# Patient Record
Sex: MALE | Race: WHITE | NOT HISPANIC OR LATINO | URBAN - METROPOLITAN AREA
[De-identification: names, ages, dates, MRNs, and addresses within clinical notes are randomized per-mention and may not be internally consistent; named-entity substitution may affect disease eponyms.]

---

## 2019-06-27 ENCOUNTER — INPATIENT (INPATIENT)
Facility: HOSPITAL | Age: 64
LOS: 2 days | Discharge: HOME | DRG: 493 | End: 2019-06-30
Attending: ORTHOPAEDIC SURGERY | Admitting: ORTHOPAEDIC SURGERY
Payer: MEDICARE

## 2019-06-27 VITALS
DIASTOLIC BLOOD PRESSURE: 79 MMHG | TEMPERATURE: 99 F | SYSTOLIC BLOOD PRESSURE: 133 MMHG | RESPIRATION RATE: 18 BRPM | OXYGEN SATURATION: 95 % | HEART RATE: 52 BPM

## 2019-06-27 LAB
ALBUMIN SERPL ELPH-MCNC: 3.9 G/DL — SIGNIFICANT CHANGE UP (ref 3.4–5)
ALP SERPL-CCNC: 82 U/L — SIGNIFICANT CHANGE UP (ref 40–120)
ALT FLD-CCNC: 23 U/L — SIGNIFICANT CHANGE UP (ref 12–42)
ANION GAP SERPL CALC-SCNC: 9 MMOL/L — SIGNIFICANT CHANGE UP (ref 9–16)
APTT BLD: 26.6 SEC — LOW (ref 27.5–36.3)
AST SERPL-CCNC: 23 U/L — SIGNIFICANT CHANGE UP (ref 15–37)
BASOPHILS NFR BLD AUTO: 0.5 % — SIGNIFICANT CHANGE UP (ref 0–2)
BILIRUB SERPL-MCNC: 0.3 MG/DL — SIGNIFICANT CHANGE UP (ref 0.2–1.2)
BUN SERPL-MCNC: 20 MG/DL — SIGNIFICANT CHANGE UP (ref 7–23)
CALCIUM SERPL-MCNC: 9.6 MG/DL — SIGNIFICANT CHANGE UP (ref 8.5–10.5)
CHLORIDE SERPL-SCNC: 103 MMOL/L — SIGNIFICANT CHANGE UP (ref 96–108)
CO2 SERPL-SCNC: 30 MMOL/L — SIGNIFICANT CHANGE UP (ref 22–31)
CREAT SERPL-MCNC: 1.36 MG/DL — HIGH (ref 0.5–1.3)
EOSINOPHIL NFR BLD AUTO: 2.4 % — SIGNIFICANT CHANGE UP (ref 0–6)
GLUCOSE SERPL-MCNC: 125 MG/DL — HIGH (ref 70–99)
HCT VFR BLD CALC: 45.5 % — SIGNIFICANT CHANGE UP (ref 39–50)
HGB BLD-MCNC: 15.7 G/DL — SIGNIFICANT CHANGE UP (ref 13–17)
IMM GRANULOCYTES NFR BLD AUTO: 0.7 % — SIGNIFICANT CHANGE UP (ref 0–1.5)
INR BLD: 1.1 — SIGNIFICANT CHANGE UP (ref 0.88–1.16)
LYMPHOCYTES # BLD AUTO: 25.2 % — SIGNIFICANT CHANGE UP (ref 13–44)
MAGNESIUM SERPL-MCNC: 2 MG/DL — SIGNIFICANT CHANGE UP (ref 1.6–2.6)
MCHC RBC-ENTMCNC: 32.5 PG — SIGNIFICANT CHANGE UP (ref 27–34)
MCHC RBC-ENTMCNC: 34.5 G/DL — SIGNIFICANT CHANGE UP (ref 32–36)
MCV RBC AUTO: 94.2 FL — SIGNIFICANT CHANGE UP (ref 80–100)
MONOCYTES NFR BLD AUTO: 5.8 % — SIGNIFICANT CHANGE UP (ref 2–14)
NEUTROPHILS NFR BLD AUTO: 65.4 % — SIGNIFICANT CHANGE UP (ref 43–77)
PLATELET # BLD AUTO: 294 K/UL — SIGNIFICANT CHANGE UP (ref 150–400)
POTASSIUM SERPL-MCNC: 4.1 MMOL/L — SIGNIFICANT CHANGE UP (ref 3.5–5.3)
POTASSIUM SERPL-SCNC: 4.1 MMOL/L — SIGNIFICANT CHANGE UP (ref 3.5–5.3)
PROT SERPL-MCNC: 7.8 G/DL — SIGNIFICANT CHANGE UP (ref 6.4–8.2)
PROTHROM AB SERPL-ACNC: 12.1 SEC — SIGNIFICANT CHANGE UP (ref 10–12.9)
RBC # BLD: 4.83 M/UL — SIGNIFICANT CHANGE UP (ref 4.2–5.8)
RBC # FLD: 12.2 % — SIGNIFICANT CHANGE UP (ref 10.3–14.5)
SODIUM SERPL-SCNC: 142 MMOL/L — SIGNIFICANT CHANGE UP (ref 132–145)
WBC # BLD: 13.5 K/UL — HIGH (ref 3.8–10.5)
WBC # FLD AUTO: 13.5 K/UL — HIGH (ref 3.8–10.5)

## 2019-06-27 PROCEDURE — 73610 X-RAY EXAM OF ANKLE: CPT | Mod: 26,RT

## 2019-06-27 PROCEDURE — 73700 CT LOWER EXTREMITY W/O DYE: CPT | Mod: 26,RT

## 2019-06-27 PROCEDURE — 99284 EMERGENCY DEPT VISIT MOD MDM: CPT | Mod: 25

## 2019-06-27 PROCEDURE — 73610 X-RAY EXAM OF ANKLE: CPT | Mod: 26,RT,76

## 2019-06-27 RX ORDER — HYDROMORPHONE HYDROCHLORIDE 2 MG/ML
0.5 INJECTION INTRAMUSCULAR; INTRAVENOUS; SUBCUTANEOUS ONCE
Refills: 0 | Status: DISCONTINUED | OUTPATIENT
Start: 2019-06-27 | End: 2019-06-27

## 2019-06-27 RX ORDER — ONDANSETRON 8 MG/1
4 TABLET, FILM COATED ORAL ONCE
Refills: 0 | Status: COMPLETED | OUTPATIENT
Start: 2019-06-27 | End: 2019-06-27

## 2019-06-27 RX ORDER — IBUPROFEN 200 MG
600 TABLET ORAL ONCE
Refills: 0 | Status: COMPLETED | OUTPATIENT
Start: 2019-06-27 | End: 2019-06-27

## 2019-06-27 RX ORDER — HYDROMORPHONE HYDROCHLORIDE 2 MG/ML
2 INJECTION INTRAMUSCULAR; INTRAVENOUS; SUBCUTANEOUS ONCE
Refills: 0 | Status: DISCONTINUED | OUTPATIENT
Start: 2019-06-27 | End: 2019-06-27

## 2019-06-27 RX ORDER — MORPHINE SULFATE 50 MG/1
4 CAPSULE, EXTENDED RELEASE ORAL ONCE
Refills: 0 | Status: DISCONTINUED | OUTPATIENT
Start: 2019-06-27 | End: 2019-06-27

## 2019-06-27 RX ADMIN — ONDANSETRON 4 MILLIGRAM(S): 8 TABLET, FILM COATED ORAL at 21:11

## 2019-06-27 RX ADMIN — HYDROMORPHONE HYDROCHLORIDE 0.5 MILLIGRAM(S): 2 INJECTION INTRAMUSCULAR; INTRAVENOUS; SUBCUTANEOUS at 21:11

## 2019-06-27 RX ADMIN — Medication 600 MILLIGRAM(S): at 20:51

## 2019-06-27 RX ADMIN — HYDROMORPHONE HYDROCHLORIDE 2 MILLIGRAM(S): 2 INJECTION INTRAMUSCULAR; INTRAVENOUS; SUBCUTANEOUS at 22:29

## 2019-06-27 NOTE — ED PROVIDER NOTE - DIAGNOSTIC INTERPRETATION
Interpreted by: JOSE Hutson  Right foot x-ray, 3 views Interpreted by ED physician  ankle x-ray, 3 views  +distal fibular fx, +dislocation, ? posterior malleolar fx; no Foreign Body noted, soft tissue swelling    Interpreted by ED physician  ankle x-ray, 3 views  joint reduced, no Foreign Body noted, soft tissue normal

## 2019-06-27 NOTE — ED ADULT NURSE NOTE - NSIMPLEMENTINTERV_GEN_ALL_ED
Implemented All Universal Safety Interventions:  [place] to call system. Call bell, personal items and telephone within reach. Instruct patient to call for assistance. Room bathroom lighting operational. Non-slip footwear when patient is off stretcher. Physically safe environment: no spills, clutter or unnecessary equipment. Stretcher in lowest position, wheels locked, appropriate side rails in place.

## 2019-06-27 NOTE — ED PROVIDER NOTE - CLINICAL SUMMARY MEDICAL DECISION MAKING FREE TEXT BOX
R ankle fracture dislocation.  NVID.  Ortho consulted.  IV placed and pain control given.  Labs sent.      Ortho came and reduced ankle.  Would like to admit for ORIF due to unstable nature of injury.

## 2019-06-27 NOTE — ED PROVIDER NOTE - MUSCULOSKELETAL, MLM
+Visible closed right ankle deformity, bone grazing the skin, +swelling. +Visible closed right ankle deformity, +swelling.

## 2019-06-27 NOTE — ED ADULT TRIAGE NOTE - CHIEF COMPLAINT QUOTE
Patient c/o right ankle pain after he slid down carpeted steps, pt states he lost his balance. Denies hitting his head.

## 2019-06-27 NOTE — ED PROVIDER NOTE - CONSTITUTIONAL, MLM
normal... Well nourished, awake, alert, oriented to person, place, time/situation and in acute distress.

## 2019-06-27 NOTE — ED PROVIDER NOTE - CARDIAC, MLM
Normal rate, regular rhythm.  No murmurs, rubs or gallops. Normal rate, regular rhythm.  No murmurs, rubs or gallops. 2+ pedal pulses

## 2019-06-27 NOTE — ED PROVIDER NOTE - OBJECTIVE STATEMENT
64 y o male with no significant hx was BIBA for right ankle burning pain s/p injury. Pt slipped down a few steps and landed on his buttock at around 7pm. Notes pain and swelling to site w/ associated tingling. No other injuries. 64 y o male with no significant hx was BIBA for right ankle burning pain s/p injury. Pt slipped down a few steps and landed on his buttock at around 7pm. Notes pain and swelling to site w/ associated tingling. No other injuries.  Denies hitting his head.  Pt denies any blood thinners.

## 2019-06-27 NOTE — ED PROVIDER NOTE - ATTENDING CONTRIBUTION TO CARE
Pt 63yo M with no PMH who was BIB EMS for ankle injury s/p mechanical trip and fall on stairs.  Severe pain and deformity to R ankle.  No breaks in skin.  No head injury.   PE - agree with PA exam as above.  NVID.   A/P - Ankle fracture dislocations.   Ortho consulted and will admit.  Reduction and splint performed here by ortho attending, Dr. Cristobal.

## 2019-06-28 LAB
ANION GAP SERPL CALC-SCNC: 11 MMOL/L — SIGNIFICANT CHANGE UP (ref 5–17)
APPEARANCE UR: CLEAR — SIGNIFICANT CHANGE UP
APTT BLD: 28 SEC — SIGNIFICANT CHANGE UP (ref 27.5–36.3)
BASOPHILS # BLD AUTO: 0.03 K/UL — SIGNIFICANT CHANGE UP (ref 0–0.2)
BASOPHILS NFR BLD AUTO: 0.3 % — SIGNIFICANT CHANGE UP (ref 0–2)
BILIRUB UR-MCNC: NEGATIVE — SIGNIFICANT CHANGE UP
BUN SERPL-MCNC: 18 MG/DL — SIGNIFICANT CHANGE UP (ref 7–23)
CALCIUM SERPL-MCNC: 8.8 MG/DL — SIGNIFICANT CHANGE UP (ref 8.4–10.5)
CHLORIDE SERPL-SCNC: 103 MMOL/L — SIGNIFICANT CHANGE UP (ref 96–108)
CO2 SERPL-SCNC: 27 MMOL/L — SIGNIFICANT CHANGE UP (ref 22–31)
COLOR SPEC: YELLOW — SIGNIFICANT CHANGE UP
CREAT SERPL-MCNC: 0.98 MG/DL — SIGNIFICANT CHANGE UP (ref 0.5–1.3)
DIFF PNL FLD: NEGATIVE — SIGNIFICANT CHANGE UP
EOSINOPHIL # BLD AUTO: 0.02 K/UL — SIGNIFICANT CHANGE UP (ref 0–0.5)
EOSINOPHIL NFR BLD AUTO: 0.2 % — SIGNIFICANT CHANGE UP (ref 0–6)
GLUCOSE BLDC GLUCOMTR-MCNC: 78 MG/DL — SIGNIFICANT CHANGE UP (ref 70–99)
GLUCOSE SERPL-MCNC: 117 MG/DL — HIGH (ref 70–99)
GLUCOSE UR QL: NEGATIVE — SIGNIFICANT CHANGE UP
HCT VFR BLD CALC: 43 % — SIGNIFICANT CHANGE UP (ref 39–50)
HGB BLD-MCNC: 14 G/DL — SIGNIFICANT CHANGE UP (ref 13–17)
IMM GRANULOCYTES NFR BLD AUTO: 0.5 % — SIGNIFICANT CHANGE UP (ref 0–1.5)
INR BLD: 1.13 — SIGNIFICANT CHANGE UP (ref 0.88–1.16)
KETONES UR-MCNC: NEGATIVE — SIGNIFICANT CHANGE UP
LEUKOCYTE ESTERASE UR-ACNC: NEGATIVE — SIGNIFICANT CHANGE UP
LYMPHOCYTES # BLD AUTO: 1.46 K/UL — SIGNIFICANT CHANGE UP (ref 1–3.3)
LYMPHOCYTES # BLD AUTO: 15.2 % — SIGNIFICANT CHANGE UP (ref 13–44)
MCHC RBC-ENTMCNC: 32.3 PG — SIGNIFICANT CHANGE UP (ref 27–34)
MCHC RBC-ENTMCNC: 32.6 GM/DL — SIGNIFICANT CHANGE UP (ref 32–36)
MCV RBC AUTO: 99.1 FL — SIGNIFICANT CHANGE UP (ref 80–100)
MONOCYTES # BLD AUTO: 0.66 K/UL — SIGNIFICANT CHANGE UP (ref 0–0.9)
MONOCYTES NFR BLD AUTO: 6.9 % — SIGNIFICANT CHANGE UP (ref 2–14)
NEUTROPHILS # BLD AUTO: 7.39 K/UL — SIGNIFICANT CHANGE UP (ref 1.8–7.4)
NEUTROPHILS NFR BLD AUTO: 76.9 % — SIGNIFICANT CHANGE UP (ref 43–77)
NITRITE UR-MCNC: NEGATIVE — SIGNIFICANT CHANGE UP
NRBC # BLD: 0 /100 WBCS — SIGNIFICANT CHANGE UP (ref 0–0)
PH UR: 6 — SIGNIFICANT CHANGE UP (ref 5–8)
PLATELET # BLD AUTO: 229 K/UL — SIGNIFICANT CHANGE UP (ref 150–400)
POTASSIUM SERPL-MCNC: 4.4 MMOL/L — SIGNIFICANT CHANGE UP (ref 3.5–5.3)
POTASSIUM SERPL-SCNC: 4.4 MMOL/L — SIGNIFICANT CHANGE UP (ref 3.5–5.3)
PROT UR-MCNC: NEGATIVE MG/DL — SIGNIFICANT CHANGE UP
PROTHROM AB SERPL-ACNC: 12.8 SEC — SIGNIFICANT CHANGE UP (ref 10–12.9)
RBC # BLD: 4.34 M/UL — SIGNIFICANT CHANGE UP (ref 4.2–5.8)
RBC # FLD: 12.3 % — SIGNIFICANT CHANGE UP (ref 10.3–14.5)
SODIUM SERPL-SCNC: 141 MMOL/L — SIGNIFICANT CHANGE UP (ref 135–145)
SP GR SPEC: 1.02 — SIGNIFICANT CHANGE UP (ref 1–1.03)
UROBILINOGEN FLD QL: 0.2 E.U./DL — SIGNIFICANT CHANGE UP
WBC # BLD: 9.61 K/UL — SIGNIFICANT CHANGE UP (ref 3.8–10.5)
WBC # FLD AUTO: 9.61 K/UL — SIGNIFICANT CHANGE UP (ref 3.8–10.5)

## 2019-06-28 PROCEDURE — 73610 X-RAY EXAM OF ANKLE: CPT | Mod: 26,RT

## 2019-06-28 PROCEDURE — 99223 1ST HOSP IP/OBS HIGH 75: CPT | Mod: GC

## 2019-06-28 PROCEDURE — 93010 ELECTROCARDIOGRAM REPORT: CPT

## 2019-06-28 RX ORDER — SODIUM CHLORIDE 9 MG/ML
1000 INJECTION, SOLUTION INTRAVENOUS
Refills: 0 | Status: DISCONTINUED | OUTPATIENT
Start: 2019-06-28 | End: 2019-06-28

## 2019-06-28 RX ORDER — ATORVASTATIN CALCIUM 80 MG/1
20 TABLET, FILM COATED ORAL AT BEDTIME
Refills: 0 | Status: DISCONTINUED | OUTPATIENT
Start: 2019-06-28 | End: 2019-06-30

## 2019-06-28 RX ORDER — METOPROLOL TARTRATE 50 MG
25 TABLET ORAL DAILY
Refills: 0 | Status: DISCONTINUED | OUTPATIENT
Start: 2019-06-28 | End: 2019-06-30

## 2019-06-28 RX ORDER — MORPHINE SULFATE 50 MG/1
2 CAPSULE, EXTENDED RELEASE ORAL
Refills: 0 | Status: DISCONTINUED | OUTPATIENT
Start: 2019-06-28 | End: 2019-06-29

## 2019-06-28 RX ORDER — PANTOPRAZOLE SODIUM 20 MG/1
40 TABLET, DELAYED RELEASE ORAL
Refills: 0 | Status: DISCONTINUED | OUTPATIENT
Start: 2019-06-28 | End: 2019-06-30

## 2019-06-28 RX ORDER — METOCLOPRAMIDE HCL 10 MG
10 TABLET ORAL EVERY 6 HOURS
Refills: 0 | Status: DISCONTINUED | OUTPATIENT
Start: 2019-06-28 | End: 2019-06-30

## 2019-06-28 RX ORDER — ACETAMINOPHEN 500 MG
650 TABLET ORAL EVERY 6 HOURS
Refills: 0 | Status: DISCONTINUED | OUTPATIENT
Start: 2019-06-28 | End: 2019-06-30

## 2019-06-28 RX ORDER — MORPHINE SULFATE 50 MG/1
4 CAPSULE, EXTENDED RELEASE ORAL EVERY 4 HOURS
Refills: 0 | Status: DISCONTINUED | OUTPATIENT
Start: 2019-06-28 | End: 2019-06-29

## 2019-06-28 RX ORDER — OXYCODONE AND ACETAMINOPHEN 5; 325 MG/1; MG/1
1 TABLET ORAL EVERY 6 HOURS
Refills: 0 | Status: DISCONTINUED | OUTPATIENT
Start: 2019-06-28 | End: 2019-06-29

## 2019-06-28 RX ORDER — METOPROLOL TARTRATE 50 MG
1 TABLET ORAL
Qty: 0 | Refills: 0 | DISCHARGE

## 2019-06-28 RX ORDER — CEFAZOLIN SODIUM 1 G
2000 VIAL (EA) INJECTION EVERY 8 HOURS
Refills: 0 | Status: COMPLETED | OUTPATIENT
Start: 2019-06-29 | End: 2019-06-29

## 2019-06-28 RX ORDER — ASPIRIN/CALCIUM CARB/MAGNESIUM 324 MG
81 TABLET ORAL
Refills: 0 | Status: DISCONTINUED | OUTPATIENT
Start: 2019-06-28 | End: 2019-06-30

## 2019-06-28 RX ORDER — ONDANSETRON 8 MG/1
4 TABLET, FILM COATED ORAL EVERY 6 HOURS
Refills: 0 | Status: DISCONTINUED | OUTPATIENT
Start: 2019-06-28 | End: 2019-06-30

## 2019-06-28 RX ORDER — ATORVASTATIN CALCIUM 80 MG/1
1 TABLET, FILM COATED ORAL
Qty: 0 | Refills: 0 | DISCHARGE

## 2019-06-28 RX ORDER — SODIUM CHLORIDE 9 MG/ML
1000 INJECTION, SOLUTION INTRAVENOUS
Refills: 0 | Status: DISCONTINUED | OUTPATIENT
Start: 2019-06-28 | End: 2019-06-29

## 2019-06-28 RX ORDER — DOCUSATE SODIUM 100 MG
100 CAPSULE ORAL THREE TIMES A DAY
Refills: 0 | Status: DISCONTINUED | OUTPATIENT
Start: 2019-06-28 | End: 2019-06-30

## 2019-06-28 RX ADMIN — Medication 100 MILLIGRAM(S): at 22:54

## 2019-06-28 RX ADMIN — MORPHINE SULFATE 2 MILLIGRAM(S): 50 CAPSULE, EXTENDED RELEASE ORAL at 23:45

## 2019-06-28 RX ADMIN — SODIUM CHLORIDE 100 MILLILITER(S): 9 INJECTION, SOLUTION INTRAVENOUS at 22:32

## 2019-06-28 RX ADMIN — Medication 650 MILLIGRAM(S): at 12:30

## 2019-06-28 RX ADMIN — ATORVASTATIN CALCIUM 20 MILLIGRAM(S): 80 TABLET, FILM COATED ORAL at 22:54

## 2019-06-28 RX ADMIN — SODIUM CHLORIDE 90 MILLILITER(S): 9 INJECTION, SOLUTION INTRAVENOUS at 06:12

## 2019-06-28 RX ADMIN — Medication 100 MILLIGRAM(S): at 06:12

## 2019-06-28 RX ADMIN — Medication 650 MILLIGRAM(S): at 12:00

## 2019-06-28 RX ADMIN — ONDANSETRON 4 MILLIGRAM(S): 8 TABLET, FILM COATED ORAL at 00:04

## 2019-06-28 NOTE — H&P ADULT - ASSESSMENT
64M w R ankle fx, for OR possible today     Admit to Orthopaedics  NPO after breakfast for possible OR today  IVF  Pain control  Hold anticoagulation for OR  Labs  UA  type and screen  CXR   EKG  Medical Clearance - [place]  Discussed with attending,

## 2019-06-28 NOTE — CONSULT NOTE ADULT - SUBJECTIVE AND OBJECTIVE BOX
KAMERON MEEKS  64 y o male with no significant hx was BIBA for right ankle burning pain s/p injury. Pt slipped down a few steps and landed on his buttock at around 7pm. Notes pain and swelling to site w/ associated tingling. No other injuries.  Denies hitting his head.  Pt denies any blood thinners. CT right ankle showed acute trimalleolar Mallory B ankle fracture, status post casting. Plan for OR later today. Medicine consulted for preoperative evaluation.       Patient is a 64y old  Male who presents with a chief complaint of R ankle fracture (28 Jun 2019 07:56)        PAST MEDICAL/SURGICAL HISTORY  PAST MEDICAL & SURGICAL HISTORY:  HTN, HLD    Meds: Metoprolol Succinate 25mg qd, Lipitor 10qd      REVIEW OF SYSTEMS:  CONSTITUTIONAL: No fever, weight loss, or fatigue  NECK: No pain or stiffness  RESPIRATORY: No cough, wheezing, chills or hemoptysis; No shortness of breath  CARDIOVASCULAR: No chest pain, palpitations, dizziness, or leg swelling  GASTROINTESTINAL: No abdominal or epigastric pain. No nausea, vomiting, or hematemesis; No diarrhea or constipation. No melena or hematochezia.  GENITOURINARY: No dysuria, frequency, hematuria, or incontinence  NEUROLOGICAL: No headaches, memory loss, loss of strength, numbness, or tremors  SKIN: No itching, burning, rashes, or lesions   PSYCHIATRIC: No depression, anxiety, mood swings, or difficulty sleeping    T(C): 36.8 (06-28-19 @ 09:00), Max: 37.1 (06-27-19 @ 19:44)  HR: 54 (06-28-19 @ 09:00) (50 - 58)  BP: 120/74 (06-28-19 @ 09:00) (104/67 - 149/80)  RR: 18 (06-28-19 @ 09:00) (16 - 18)  SpO2: 95% (06-28-19 @ 09:00) (94% - 98%)  Wt(kg): --Vital Signs Last 24 Hrs  T(C): 36.8 (28 Jun 2019 09:00), Max: 37.1 (27 Jun 2019 19:44)  T(F): 98.3 (28 Jun 2019 09:00), Max: 98.7 (27 Jun 2019 19:44)  HR: 54 (28 Jun 2019 09:00) (50 - 58)  BP: 120/74 (28 Jun 2019 09:00) (104/67 - 149/80)  BP(mean): --  RR: 18 (28 Jun 2019 09:00) (16 - 18)  SpO2: 95% (28 Jun 2019 09:00) (94% - 98%)    PHYSICAL EXAM:  GENERAL: NAD, well-groomed, well-developed  HEENT: Atraumatic, Normocephalic, EOMI, PERRLA, conjunctiva and sclera clear  NECK: Supple, No JVD  NERVOUS SYSTEM:  Alert & Oriented X3, Good concentration; no focal deficits  CHEST/LUNG: Clear to percussion bilaterally; No rales, rhonchi, wheezing, or rubs  HEART: Regular rate and rhythm; No murmurs, rubs, or gallops  ABDOMEN: Soft, Nontender, Nondistended; Bowel sounds present  EXTREMITIES:  No clubbing, cyanosis, or edema, RLE in ACE band    Consultant(s) Notes Reviewed:  [x ] YES  [ ] NO  Care Discussed with Consultants/Other Providers [ x] YES  [ ] NO    LABS:  CBC   06-28-19 @ 06:56  Hematcorit 43.0  Hemoglobin 14.0  Mean Cell Hemoglobin 32.3  Platelet Count-Automated 229  RBC Count 4.34  Red Cell Distrib Width 12.3  Wbc Count 9.61  06-27-19 @ 21:16  Hematcorit 45.5  Hemoglobin 15.7  Mean Cell Hemoglobin 32.5  Platelet Count-Automated 294  RBC Count 4.83  Red Cell Distrib Width 12.2  Wbc Count 13.5      BMP  06-28-19 @ 06:56  Anion Gap. Serum 11  Blood Urea Nitrogen,Serm 18  Calcium, Total Serum 8.8  Carbon Dioxide, Serum 27  Chloride, Serum 103  Creatinine, Serum 0.98  eGFR in  93  eGFR in Non Afican American 81  Gloucose, serum 117  Potassium, Serum 4.4  Sodium, Serum 141              06-27-19 @ 21:16  Anion Gap. Serum 9  Blood Urea Nitrogen,Serm 20  Calcium, Total Serum 9.6  Carbon Dioxide, Serum 30  Chloride, Serum 103  Creatinine, Serum 1.36  eGFR in  63  eGFR in Non Afican American 54  Gloucose, serum 125  Potassium, Serum 4.1  Sodium, Serum 142                  CMP  06-28-19 @ 06:56  Cele Aminotransferase(ALT/SGPT)--  Albumin, Serum --  Alkaline Phosphatase, Serum --  Anion Gap, Serum 11  Aspartate Aminotransferase (AST/SGOT)--  Bilirubin Total, Serum --  Blood Urea Nitrogen, Serum 18  Calcium,Total Serum 8.8  Carbon Dioxide, Serum 27  Chloride, Serum 103  Creatinine, Serum 0.98  eGFR if  93  eGFR if Non African American 81  Glucose, Serum 117  Potassium, Serum 4.4  Protein Total, Serum --  Sodium, Serum 141                      06-27-19 @ 21:16  Cele Aminotransferase(ALT/SGPT)23  Albumin, Serum 3.9  Alkaline Phosphatase, Serum 82  Anion Gap, Serum 9  Aspartate Aminotransferase (AST/SGOT)23  Bilirubin Total, Serum 0.3  Blood Urea Nitrogen, Serum 20  Calcium,Total Serum 9.6  Carbon Dioxide, Serum 30  Chloride, Serum 103  Creatinine, Serum 1.36  eGFR if  63  eGFR if Non African American 54  Glucose, Serum 125  Potassium, Serum 4.1  Protein Total, Serum 7.8  Sodium, Serum 142                          PT/INR  PT/INR  06-28-19 @ 06:56  INR 1.13  Prothrombin Time Comment --  Prothrobin Time, [id number]  PT/INR  06-27-19 @ 21:27  INR 1.10  Prothrombin Time Comment --  Prothrobin Time, [id number]      Amylase/Lipase            RADIOLOGY & ADDITIONAL TESTS:    EKG: sinus bradycardia at 57bpm      Imaging Personally Reviewed:  [x ] YES  [ ] NO KAMERON MEEKS  64 y o male with no significant hx was BIBA for right ankle burning pain s/p injury. Pt slipped down a few steps and landed on his buttock at around 7pm. Notes pain and swelling to site w/ associated tingling. No other injuries.  Denies hitting his head.  Pt denies any blood thinners. CT right ankle showed acute trimalleolar Mallory B ankle fracture, status post casting. Plan for OR later today. Medicine consulted for preoperative evaluation.       Patient is a 64y old  Male who presents with a chief complaint of R ankle fracture (28 Jun 2019 07:56)        PAST MEDICAL & SURGICAL HISTORY:  HTN, HLD    Family Hx: No hx of ankle fx    Meds: Metoprolol Succinate 25mg qd, Lipitor 10qd      REVIEW OF SYSTEMS:  CONSTITUTIONAL: No fever, weight loss, or fatigue  NECK: No pain or stiffness  RESPIRATORY: No cough, wheezing, chills or hemoptysis; No shortness of breath  CARDIOVASCULAR: No chest pain, palpitations, dizziness, or leg swelling  GASTROINTESTINAL: No abdominal or epigastric pain. No nausea, vomiting, or hematemesis; No diarrhea or constipation. No melena or hematochezia.  GENITOURINARY: No dysuria, frequency, hematuria, or incontinence  NEUROLOGICAL: No headaches, memory loss, loss of strength, numbness, or tremors  SKIN: No itching, burning, rashes, or lesions   PSYCHIATRIC: No depression, anxiety, mood swings, or difficulty sleeping    T(C): 36.8 (06-28-19 @ 09:00), Max: 37.1 (06-27-19 @ 19:44)  HR: 54 (06-28-19 @ 09:00) (50 - 58)  BP: 120/74 (06-28-19 @ 09:00) (104/67 - 149/80)  RR: 18 (06-28-19 @ 09:00) (16 - 18)  SpO2: 95% (06-28-19 @ 09:00) (94% - 98%)  Wt(kg): --Vital Signs Last 24 Hrs  T(C): 36.8 (28 Jun 2019 09:00), Max: 37.1 (27 Jun 2019 19:44)  T(F): 98.3 (28 Jun 2019 09:00), Max: 98.7 (27 Jun 2019 19:44)  HR: 54 (28 Jun 2019 09:00) (50 - 58)  BP: 120/74 (28 Jun 2019 09:00) (104/67 - 149/80)  BP(mean): --  RR: 18 (28 Jun 2019 09:00) (16 - 18)  SpO2: 95% (28 Jun 2019 09:00) (94% - 98%)    PHYSICAL EXAM:  GENERAL: NAD, well-groomed, well-developed  HEENT: Atraumatic, Normocephalic, EOMI, PERRLA, conjunctiva and sclera clear  NECK: Supple, No JVD  NERVOUS SYSTEM:  Alert & Oriented X3, Good concentration; no focal deficits  CHEST/LUNG: Clear to percussion bilaterally; No rales, rhonchi, wheezing, or rubs  HEART: Regular rate and rhythm; No murmurs, rubs, or gallops  ABDOMEN: Soft, Nontender, Nondistended; Bowel sounds present  EXTREMITIES:  No clubbing, cyanosis, or edema, RLE in ACE band    Consultant(s) Notes Reviewed:  [x ] YES  [ ] NO  Care Discussed with Consultants/Other Providers [ x] YES  [ ] NO    LABS:  CBC   06-28-19 @ 06:56  Hematcorit 43.0  Hemoglobin 14.0  Mean Cell Hemoglobin 32.3  Platelet Count-Automated 229  RBC Count 4.34  Red Cell Distrib Width 12.3  Wbc Count 9.61  06-27-19 @ 21:16  Hematcorit 45.5  Hemoglobin 15.7  Mean Cell Hemoglobin 32.5  Platelet Count-Automated 294  RBC Count 4.83  Red Cell Distrib Width 12.2  Wbc Count 13.5      BMP  06-28-19 @ 06:56  Anion Gap. Serum 11  Blood Urea Nitrogen,Serm 18  Calcium, Total Serum 8.8  Carbon Dioxide, Serum 27  Chloride, Serum 103  Creatinine, Serum 0.98  eGFR in  93  eGFR in Non Afican American 81  Gloucose, serum 117  Potassium, Serum 4.4  Sodium, Serum 141              06-27-19 @ 21:16  Anion Gap. Serum 9  Blood Urea Nitrogen,Serm 20  Calcium, Total Serum 9.6  Carbon Dioxide, Serum 30  Chloride, Serum 103  Creatinine, Serum 1.36  eGFR in  63  eGFR in Non Afican American 54  Gloucose, serum 125  Potassium, Serum 4.1  Sodium, Serum 142                  CMP  06-28-19 @ 06:56  Cele Aminotransferase(ALT/SGPT)--  Albumin, Serum --  Alkaline Phosphatase, Serum --  Anion Gap, Serum 11  Aspartate Aminotransferase (AST/SGOT)--  Bilirubin Total, Serum --  Blood Urea Nitrogen, Serum 18  Calcium,Total Serum 8.8  Carbon Dioxide, Serum 27  Chloride, Serum 103  Creatinine, Serum 0.98  eGFR if  93  eGFR if Non African American 81  Glucose, Serum 117  Potassium, Serum 4.4  Protein Total, Serum --  Sodium, Serum 141                      06-27-19 @ 21:16  Cele Aminotransferase(ALT/SGPT)23  Albumin, Serum 3.9  Alkaline Phosphatase, Serum 82  Anion Gap, Serum 9  Aspartate Aminotransferase (AST/SGOT)23  Bilirubin Total, Serum 0.3  Blood Urea Nitrogen, Serum 20  Calcium,Total Serum 9.6  Carbon Dioxide, Serum 30  Chloride, Serum 103  Creatinine, Serum 1.36  eGFR if  63  eGFR if Non African American 54  Glucose, Serum 125  Potassium, Serum 4.1  Protein Total, Serum 7.8  Sodium, Serum 142                          PT/INR  PT/INR  06-28-19 @ 06:56  INR 1.13  Prothrombin Time Comment --  Prothrobin Time, [id number]  PT/INR  06-27-19 @ 21:27  INR 1.10  Prothrombin Time Comment --  Prothrobin Time, [id number]      Amylase/Lipase            RADIOLOGY & ADDITIONAL TESTS:    EKG: sinus bradycardia at 57bpm      Imaging Personally Reviewed:  [x ] YES  [ ] NO

## 2019-06-28 NOTE — PRE-OP CHECKLIST - SELECT TESTS ORDERED
PT/PTT/Hepatic Function/EKG/Urinalysis/Type and Screen/Type and Cross/CXR/CBC/CMP/INR/BMP PT/PTT/Hepatic Function/EKG/CXR/Urinalysis/CBC/CMP/INR/Type and Cross/78- awaiting Ortho team to call back for low BP and low FS. Paged multiple times./POCT Blood Glucose/Type and Screen/BMP CBC/INR/Urinalysis/EKG/POCT Blood Glucose/Hepatic Function/CXR/Type and Cross/Type and Screen/CMP/BMP/78- Ortho team aware of BP and FS/PT/PTT

## 2019-06-28 NOTE — PATIENT PROFILE ADULT - VISION (WITH CORRECTIVE LENSES IF THE PATIENT USUALLY WEARS THEM):
Normal vision: sees adequately in most situations; can see medication labels, newsprint/patient to have Left eye cataract surgery July 11, 2019

## 2019-06-28 NOTE — H&P ADULT - NSHPPHYSICALEXAM_GEN_ALL_CORE
GEN: NAD, AOx3  RLE: elevated in splint  Toes minimally swollen, WWP, good cap return  SILT toes  Wiggles toes appropriately   compartments soft

## 2019-06-28 NOTE — H&P ADULT - HISTORY OF PRESENT ILLNESS
64 y o male with no significant hx was BIBA for right ankle burning pain s/p injury. Pt slipped down a few steps and landed on his buttock at around 7pm. Notes pain and swelling to site w/ associated tingling. No other injuries.  Denies hitting his head.  Pt denies any blood thinners.

## 2019-06-28 NOTE — CONSULT NOTE ADULT - ASSESSMENT
64M with h/o HTN, HLD BIBA for right ankle burning pain s/p mechanical fall found to have acute R trimalleolar Mallory B ankle fracture, status post casting. Plan for OR later today. Medicine consulted for preoperative evaluation.     #Preoperative evaluation  -MET>4  -RCRI for preoperative risk: Class I, 0 points  -Angelo perioperative risk for GARY 0% risk of MI or cardiac arrest  -No hx CAD, CHF, CVA, CKD or IDDM  -Plan for ankle surgery later today  -Pt is medically optimized for this intermediate risk procedure pending rounds with attending    #KEY  -resolved  -likely pre-renal  -c/w IVF while NPO    #HTN  -well controlled  -c/w Toprol XL 25qd (home med)    #HLD  -c/w Lipitor 10qd (home med)    Medicine will follow 64M with h/o HTN, HLD BIBA for right ankle burning pain s/p mechanical fall found to have acute R trimalleolar Mallory B ankle fracture, status post casting. Plan for OR later today. Medicine consulted for preoperative evaluation.     #Preoperative evaluation  -MET>4  -RCRI for preoperative risk: Class I, 0 points  -Angelo perioperative risk for GARY 0% risk of MI or cardiac arrest  -No hx CAD, CHF, CVA, CKD or IDDM  -Plan for ankle surgery later today  -Pt is medically optimized for this intermediate risk procedure pending rounds with attending    #KEY  -resolved  -likely pre-renal  -c/w IVF while NPO    #HTN  -well controlled  -please reduce Toprol XL to 12.5d given bradycardia    #HLD  -c/w Lipitor 10qd (home med)    Medicine will follow 64M with h/o HTN, HLD BIBA for right ankle burning pain s/p mechanical fall found to have acute R trimalleolar Mallory B ankle fracture, status post casting. Plan for OR later today. Medicine consulted for preoperative evaluation.     #Preoperative evaluation  -MET>4  -RCRI for preoperative risk: Class I, 0 points  -Angelo perioperative risk for GARY 0% risk of MI or cardiac arrest  -No hx CAD, CHF, CVA, CKD or IDDM  -Plan for ankle surgery later today  -Pt is medically optimized for this intermediate risk procedure pending rounds with attending    #KEY  -resolved  -likely pre-renal  -c/w IVF while NPO    #HTN  -well controlled  -Hold the BB pre-op and consider changing the metoprolol to lisinopril 2.5 or 5mg post op given bradycardia on the BB    #HLD  -c/w Lipitor 10qd (home med)    Medicine will follow 64M with h/o HTN, HLD BIBA for right ankle burning pain s/p mechanical fall found to have acute R trimalleolar Mallory B ankle fracture, status post casting. Plan for OR later today. Medicine consulted for preoperative evaluation.     #Preoperative evaluation  -MET>4  -RCRI for preoperative risk: Class I, 0 points  -Angelo perioperative risk for GARY 0% risk of MI or cardiac arrest  -No hx CAD, CHF, CVA, CKD or IDDM  -Plan for ankle surgery later today  -Pt is medically optimized for this intermediate risk procedure    #KEY  -resolved  -likely pre-renal  -c/w IVF while NPO    #HTN  -well controlled  -Hold the BB pre-op and consider changing the metoprolol to lisinopril 2.5 or 5mg post op given bradycardia on the BB    #HLD  -c/w Lipitor 10qd (home med)    Medicine will follow

## 2019-06-29 LAB
ANION GAP SERPL CALC-SCNC: 10 MMOL/L — SIGNIFICANT CHANGE UP (ref 5–17)
BUN SERPL-MCNC: 10 MG/DL — SIGNIFICANT CHANGE UP (ref 7–23)
CALCIUM SERPL-MCNC: 8.5 MG/DL — SIGNIFICANT CHANGE UP (ref 8.4–10.5)
CHLORIDE SERPL-SCNC: 102 MMOL/L — SIGNIFICANT CHANGE UP (ref 96–108)
CO2 SERPL-SCNC: 27 MMOL/L — SIGNIFICANT CHANGE UP (ref 22–31)
CREAT SERPL-MCNC: 0.96 MG/DL — SIGNIFICANT CHANGE UP (ref 0.5–1.3)
GLUCOSE SERPL-MCNC: 108 MG/DL — HIGH (ref 70–99)
HCT VFR BLD CALC: 40.6 % — SIGNIFICANT CHANGE UP (ref 39–50)
HCV AB S/CO SERPL IA: 0.14 S/CO — SIGNIFICANT CHANGE UP
HCV AB SERPL-IMP: SIGNIFICANT CHANGE UP
HGB BLD-MCNC: 13.9 G/DL — SIGNIFICANT CHANGE UP (ref 13–17)
MCHC RBC-ENTMCNC: 34.2 GM/DL — SIGNIFICANT CHANGE UP (ref 32–36)
MCHC RBC-ENTMCNC: 34.3 PG — HIGH (ref 27–34)
MCV RBC AUTO: 100.2 FL — HIGH (ref 80–100)
NRBC # BLD: 0 /100 WBCS — SIGNIFICANT CHANGE UP (ref 0–0)
PLATELET # BLD AUTO: 204 K/UL — SIGNIFICANT CHANGE UP (ref 150–400)
POTASSIUM SERPL-MCNC: 3.8 MMOL/L — SIGNIFICANT CHANGE UP (ref 3.5–5.3)
POTASSIUM SERPL-SCNC: 3.8 MMOL/L — SIGNIFICANT CHANGE UP (ref 3.5–5.3)
RBC # BLD: 4.05 M/UL — LOW (ref 4.2–5.8)
RBC # FLD: 12.8 % — SIGNIFICANT CHANGE UP (ref 10.3–14.5)
SODIUM SERPL-SCNC: 139 MMOL/L — SIGNIFICANT CHANGE UP (ref 135–145)
WBC # BLD: 9.36 K/UL — SIGNIFICANT CHANGE UP (ref 3.8–10.5)
WBC # FLD AUTO: 9.36 K/UL — SIGNIFICANT CHANGE UP (ref 3.8–10.5)

## 2019-06-29 PROCEDURE — 99233 SBSQ HOSP IP/OBS HIGH 50: CPT | Mod: GC

## 2019-06-29 RX ORDER — OXYCODONE HYDROCHLORIDE 5 MG/1
10 TABLET ORAL EVERY 4 HOURS
Refills: 0 | Status: DISCONTINUED | OUTPATIENT
Start: 2019-06-29 | End: 2019-06-30

## 2019-06-29 RX ORDER — OXYCODONE HYDROCHLORIDE 5 MG/1
5 TABLET ORAL EVERY 4 HOURS
Refills: 0 | Status: DISCONTINUED | OUTPATIENT
Start: 2019-06-29 | End: 2019-06-30

## 2019-06-29 RX ORDER — MORPHINE SULFATE 50 MG/1
4 CAPSULE, EXTENDED RELEASE ORAL EVERY 4 HOURS
Refills: 0 | Status: DISCONTINUED | OUTPATIENT
Start: 2019-06-29 | End: 2019-06-30

## 2019-06-29 RX ADMIN — Medication 2000 MILLIGRAM(S): at 11:00

## 2019-06-29 RX ADMIN — Medication 81 MILLIGRAM(S): at 05:16

## 2019-06-29 RX ADMIN — OXYCODONE HYDROCHLORIDE 5 MILLIGRAM(S): 5 TABLET ORAL at 18:56

## 2019-06-29 RX ADMIN — MORPHINE SULFATE 4 MILLIGRAM(S): 50 CAPSULE, EXTENDED RELEASE ORAL at 05:17

## 2019-06-29 RX ADMIN — Medication 100 MILLIGRAM(S): at 13:56

## 2019-06-29 RX ADMIN — MORPHINE SULFATE 4 MILLIGRAM(S): 50 CAPSULE, EXTENDED RELEASE ORAL at 00:47

## 2019-06-29 RX ADMIN — OXYCODONE HYDROCHLORIDE 5 MILLIGRAM(S): 5 TABLET ORAL at 17:56

## 2019-06-29 RX ADMIN — MORPHINE SULFATE 4 MILLIGRAM(S): 50 CAPSULE, EXTENDED RELEASE ORAL at 01:15

## 2019-06-29 RX ADMIN — Medication 100 MILLIGRAM(S): at 05:16

## 2019-06-29 RX ADMIN — MORPHINE SULFATE 4 MILLIGRAM(S): 50 CAPSULE, EXTENDED RELEASE ORAL at 06:00

## 2019-06-29 RX ADMIN — Medication 2000 MILLIGRAM(S): at 03:00

## 2019-06-29 RX ADMIN — Medication 100 MILLIGRAM(S): at 22:37

## 2019-06-29 RX ADMIN — PANTOPRAZOLE SODIUM 40 MILLIGRAM(S): 20 TABLET, DELAYED RELEASE ORAL at 07:17

## 2019-06-29 RX ADMIN — Medication 650 MILLIGRAM(S): at 07:17

## 2019-06-29 RX ADMIN — OXYCODONE AND ACETAMINOPHEN 1 TABLET(S): 5; 325 TABLET ORAL at 12:00

## 2019-06-29 RX ADMIN — Medication 81 MILLIGRAM(S): at 17:56

## 2019-06-29 RX ADMIN — OXYCODONE AND ACETAMINOPHEN 1 TABLET(S): 5; 325 TABLET ORAL at 11:00

## 2019-06-29 RX ADMIN — Medication 25 MILLIGRAM(S): at 11:12

## 2019-06-29 RX ADMIN — ATORVASTATIN CALCIUM 20 MILLIGRAM(S): 80 TABLET, FILM COATED ORAL at 22:37

## 2019-06-29 NOTE — PROGRESS NOTE ADULT - ASSESSMENT
64y m s/p R ankle ORIF  on 6/28    -PT NWB RLE  -Telemetry for NESSA  -Pain control  -DVT PPX: ASA  -Dispo Planning: pending

## 2019-06-29 NOTE — PHYSICAL THERAPY INITIAL EVALUATION ADULT - CRITERIA FOR SKILLED THERAPEUTIC INTERVENTIONS
functional limitations in following categories/rehab potential/anticipated discharge recommendation/predicted duration of therapy intervention/anticipated equipment needs at discharge/impairments found/therapy frequency

## 2019-06-29 NOTE — PROGRESS NOTE ADULT - ASSESSMENT
64M with h/o HTN, HLD BIBA for right ankle burning pain s/p mechanical fall found to have acute R trimalleolar Mallory B ankle fracture, status post casting. Plan for OR later today. Medicine consulted for preoperative evaluation.     #Preoperative evaluation: s/p surgery on 6/29  -MET>4  -RCRI for preoperative risk: Class I, 0 points  -Angelo perioperative risk for GARY 0% risk of MI or cardiac arrest  -No hx CAD, CHF, CVA, CKD or IDDM  -Pt is medically optimized for this intermediate risk procedure      #KEY  -resolved  -likely pre-renal  -c/w IVF while NPO    #HTN  -well controlled  -Consider changing the metoprolol to lisinopril 2.5 or 5mg given bradycardia on the BB    #HLD  -c/w Lipitor 10qd (home med)    Medicine will sign off. Thank you for the consult. 64M with h/o HTN, HLD BIBA for right ankle burning pain s/p mechanical fall found to have acute R trimalleolar Mallory B ankle fracture, now s/p OR on 6/28.     #KEY  -resolved  -likely pre-renal  -c/w IVF while NPO    #HTN  -well controlled  -Consider changing the Metoprolol to Lisinopril 2.5 or 5mg if HR consistently below 60    #HLD  -c/w Lipitor 10qd (home med)    #Preoperative evaluation: s/p surgery on 6/29  -MET>4  -RCRI for preoperative risk: Class I, 0 points  -Angelo perioperative risk for GARY 0% risk of MI or cardiac arrest  -No hx CAD, CHF, CVA, CKD or IDDM  -Pt is medically optimized for this intermediate risk procedure    Medicine will sign off. Thank you for the consult.

## 2019-06-29 NOTE — PROGRESS NOTE ADULT - ASSESSMENT
A/P: 64yMale s/p  R ankle ORIF  - Stable  - Pain Control  - DVT ppx: ASA 81 BID  - Post op abx: Ancef 2g Q8H x 2 doses  - WBS: NWB RLE  - PT: pending PT eval    Ortho Pager [phone]

## 2019-06-29 NOTE — PHYSICAL THERAPY INITIAL EVALUATION ADULT - LIVES WITH, PROFILE
alone/couple of steps to enter home, pt will have family stay with him for first few weeks after D/C

## 2019-06-30 ENCOUNTER — TRANSCRIPTION ENCOUNTER (OUTPATIENT)
Age: 64
End: 2019-06-30

## 2019-06-30 VITALS
OXYGEN SATURATION: 96 % | SYSTOLIC BLOOD PRESSURE: 133 MMHG | HEART RATE: 67 BPM | RESPIRATION RATE: 17 BRPM | TEMPERATURE: 99 F | DIASTOLIC BLOOD PRESSURE: 77 MMHG

## 2019-06-30 LAB
ANION GAP SERPL CALC-SCNC: 8 MMOL/L — SIGNIFICANT CHANGE UP (ref 5–17)
BUN SERPL-MCNC: 11 MG/DL — SIGNIFICANT CHANGE UP (ref 7–23)
CALCIUM SERPL-MCNC: 8.7 MG/DL — SIGNIFICANT CHANGE UP (ref 8.4–10.5)
CHLORIDE SERPL-SCNC: 101 MMOL/L — SIGNIFICANT CHANGE UP (ref 96–108)
CO2 SERPL-SCNC: 30 MMOL/L — SIGNIFICANT CHANGE UP (ref 22–31)
CREAT SERPL-MCNC: 1.06 MG/DL — SIGNIFICANT CHANGE UP (ref 0.5–1.3)
GLUCOSE SERPL-MCNC: 99 MG/DL — SIGNIFICANT CHANGE UP (ref 70–99)
HCT VFR BLD CALC: 40 % — SIGNIFICANT CHANGE UP (ref 39–50)
HGB BLD-MCNC: 14.1 G/DL — SIGNIFICANT CHANGE UP (ref 13–17)
MCHC RBC-ENTMCNC: 35.2 PG — HIGH (ref 27–34)
MCHC RBC-ENTMCNC: 35.3 GM/DL — SIGNIFICANT CHANGE UP (ref 32–36)
MCV RBC AUTO: 99.8 FL — SIGNIFICANT CHANGE UP (ref 80–100)
NRBC # BLD: 0 /100 WBCS — SIGNIFICANT CHANGE UP (ref 0–0)
PLATELET # BLD AUTO: 189 K/UL — SIGNIFICANT CHANGE UP (ref 150–400)
POTASSIUM SERPL-MCNC: 3.8 MMOL/L — SIGNIFICANT CHANGE UP (ref 3.5–5.3)
POTASSIUM SERPL-SCNC: 3.8 MMOL/L — SIGNIFICANT CHANGE UP (ref 3.5–5.3)
RBC # BLD: 4.01 M/UL — LOW (ref 4.2–5.8)
RBC # FLD: 12.9 % — SIGNIFICANT CHANGE UP (ref 10.3–14.5)
SODIUM SERPL-SCNC: 139 MMOL/L — SIGNIFICANT CHANGE UP (ref 135–145)
WBC # BLD: 9.64 K/UL — SIGNIFICANT CHANGE UP (ref 3.8–10.5)
WBC # FLD AUTO: 9.64 K/UL — SIGNIFICANT CHANGE UP (ref 3.8–10.5)

## 2019-06-30 RX ORDER — OXYCODONE HYDROCHLORIDE 5 MG/1
1 TABLET ORAL
Qty: 30 | Refills: 0
Start: 2019-06-30 | End: 2019-07-06

## 2019-06-30 RX ORDER — OXYCODONE AND ACETAMINOPHEN 5; 325 MG/1; MG/1
1 TABLET ORAL
Qty: 30 | Refills: 0
Start: 2019-06-30 | End: 2019-07-06

## 2019-06-30 RX ORDER — DOCUSATE SODIUM 100 MG
1 CAPSULE ORAL
Qty: 28 | Refills: 0
Start: 2019-06-30 | End: 2019-07-13

## 2019-06-30 RX ORDER — ASPIRIN/CALCIUM CARB/MAGNESIUM 324 MG
1 TABLET ORAL
Qty: 56 | Refills: 0
Start: 2019-06-30 | End: 2019-07-27

## 2019-06-30 RX ORDER — POTASSIUM CHLORIDE 20 MEQ
40 PACKET (EA) ORAL ONCE
Refills: 0 | Status: COMPLETED | OUTPATIENT
Start: 2019-06-30 | End: 2019-06-30

## 2019-06-30 RX ADMIN — OXYCODONE HYDROCHLORIDE 5 MILLIGRAM(S): 5 TABLET ORAL at 00:45

## 2019-06-30 RX ADMIN — Medication 100 MILLIGRAM(S): at 05:24

## 2019-06-30 RX ADMIN — Medication 25 MILLIGRAM(S): at 05:24

## 2019-06-30 RX ADMIN — OXYCODONE HYDROCHLORIDE 5 MILLIGRAM(S): 5 TABLET ORAL at 16:00

## 2019-06-30 RX ADMIN — Medication 40 MILLIEQUIVALENT(S): at 18:08

## 2019-06-30 RX ADMIN — Medication 100 MILLIGRAM(S): at 13:17

## 2019-06-30 RX ADMIN — Medication 81 MILLIGRAM(S): at 18:07

## 2019-06-30 RX ADMIN — PANTOPRAZOLE SODIUM 40 MILLIGRAM(S): 20 TABLET, DELAYED RELEASE ORAL at 05:24

## 2019-06-30 RX ADMIN — Medication 81 MILLIGRAM(S): at 05:24

## 2019-06-30 RX ADMIN — OXYCODONE HYDROCHLORIDE 5 MILLIGRAM(S): 5 TABLET ORAL at 00:12

## 2019-06-30 NOTE — DISCHARGE NOTE PROVIDER - NSDCCPCAREPLAN_GEN_ALL_CORE_FT
PRINCIPAL DISCHARGE DIAGNOSIS  Diagnosis: Ankle fracture, bimalleolar, closed  Assessment and Plan of Treatment:

## 2019-06-30 NOTE — PROGRESS NOTE ADULT - SUBJECTIVE AND OBJECTIVE BOX
Ortho Note    Pt comfortable without complaints, and states that pain is controlled.  Denies CP, SOB, N/V, numbness/tingling down le b/l    Vital Signs Last 24 Hrs  T(C): 36.8 (06-28-19 @ 09:00), Max: 36.8 (06-28-19 @ 09:00)  T(F): 98.3 (06-28-19 @ 09:00), Max: 98.3 (06-28-19 @ 09:00)  HR: 54 (06-28-19 @ 09:00) (54 - 56)  BP: 120/74 (06-28-19 @ 09:00) (104/67 - 120/74)  BP(mean): --  RR: 18 (06-28-19 @ 09:00) (18 - 18)  SpO2: 95% (06-28-19 @ 09:00) (95% - 96%)      General: Pt Alert and oriented, NAD  DSG posterior splint with ace wrap RLE C/D/I  Pulses: brisk cap refill b/l  Sensation: pt has sensation in toes in RLE  Motor: Pt wiggling toes RLE                           14.0   9.61  )-----------( 229      ( 28 Jun 2019 06:56 )             43.0   28 Jun 2019 06:56    141    |  103    |  18     ----------------------------<  117    4.4     |  27     |  0.98     Ca    8.8        28 Jun 2019 06:56  Mg     2.0       27 Jun 2019 21:16    TPro  7.8    /  Alb  3.9    /  TBili  0.3    /  DBili  x      /  AST  23     /  ALT  23     /  AlkPhos  82     27 Jun 2019 21:16      A/P: 64yMale with right ankle fracture  - Stable  - Pain Control as needed  - Medically cleared for OR by Dr. Marquez  - DVT ppx:  scds  - PT, WBS: NWB RLE  - Plan for OR this evening for R ankle ORIF  - NPO/IVF  - Dispo: pending OR    Ortho Pager [phone]
Ortho Post Op Check    Procedure: R ankle ORIF  Surgeon: Susu    Pt comfortable without complaints, pain controlled  Denies CP, SOB, N/V, numbness/tingling     Vital Signs Last 24 Hrs  T(C): 37.8 (06-29-19 @ 05:13), Max: 37.8 (06-29-19 @ 05:13)  T(F): 100 (06-29-19 @ 05:13), Max: 100 (06-29-19 @ 05:13)  HR: 74 (06-29-19 @ 05:13) (74 - 74)  BP: 127/69 (06-29-19 @ 05:13) (127/69 - 127/69)  BP(mean): --  RR: 16 (06-29-19 @ 05:13) (16 - 16)  SpO2: 96% (06-29-19 @ 05:13) (96% - 96%)    AVSS  General: Pt Alert and oriented, NAD  RLE: Short leg splint C/D/I  Pulses: Toes WWP; Cap refill < 2 sec  Sensation: SILT and symmetric to contralateral extremity  Motor: Motor intact to toes                          14.0   9.61  )-----------( 229      ( 28 Jun 2019 06:56 )             43.0     28 Jun 2019 06:56    141    |  103    |  18     ----------------------------<  117    4.4     |  27     |  0.98     Mg     2.0       27 Jun 2019 21:16    TPro  7.8    /  Alb  3.9    /  TBili  0.3    /  DBili  x      /  AST  23     /  ALT  23     /  AlkPhos  82     27 Jun 2019 21:16      Post-op X-Ray: R ankle s/p ORIF
Pt is s/p s/p R ankle ORIF on 6/28 POD2    SUBJECTIVE: Patient seen and examined at bedside.   pain is improved, denies any new complaints. doing well overall  appetite is good, tolerating PO  had BM  worked w/ PT, plan for d/c home today    OBJECTIVE:    ICU Vital Signs Last 24 Hrs  T(C): 36.9 (30 Jun 2019 09:21), Max: 37.7 (30 Jun 2019 00:14)  T(F): 98.5 (30 Jun 2019 09:21), Max: 99.9 (30 Jun 2019 00:14)  HR: 58 (30 Jun 2019 09:21) (48 - 64)  BP: 142/84 (30 Jun 2019 09:21) (128/75 - 157/80)  BP(mean): --  ABP: --  ABP(mean): --  RR: 18 (30 Jun 2019 09:21) (14 - 18)  SpO2: 95% (30 Jun 2019 09:21) (93% - 100%)        06-28 @ 07:01 - 06-29 @ 07:00  --------------------------------------------------------  IN: 290 mL / OUT: 1725 mL / NET: -1435 mL    06-29 @ 07:01 - 06-29 @ 11:17  --------------------------------------------------------  IN: 300 mL / OUT: 0 mL / NET: 300 mL      CAPILLARY BLOOD GLUCOSE      POCT Blood Glucose.: 78 mg/dL (28 Jun 2019 16:22)      PHYSICAL EXAM:    General: NAD  HEENT: NC/AT; PERRL, clear conjunctiva  Neck: supple  Respiratory: CTA b/l  Cardiovascular: +S1/S2; RRR  Abdomen: soft, NT/ND; +BS x4  Extremities: WWP, 2+ peripheral pulses b/l; no LE edema  Skin: normal color and turgor; no rash  Neurological: AAOx3, no focal deficits.     MEDICATIONS:  MEDICATIONS  (STANDING):  aspirin enteric coated 81 milliGRAM(s) Oral two times a day  atorvastatin 20 milliGRAM(s) Oral at bedtime  docusate sodium 100 milliGRAM(s) Oral three times a day  lactated ringers. 1000 milliLiter(s) (100 mL/Hr) IV Continuous <Continuous>  metoprolol succinate ER 25 milliGRAM(s) Oral daily  pantoprazole    Tablet 40 milliGRAM(s) Oral before breakfast    MEDICATIONS  (PRN):  acetaminophen   Tablet .. 650 milliGRAM(s) Oral every 6 hours PRN Temp greater or equal to 38C (100.4F), Mild Pain (1 - 3)  metoclopramide Injectable 10 milliGRAM(s) IV Push every 6 hours PRN Nausea and/or Vomiting  morphine  - Injectable 4 milliGRAM(s) IV Push every 4 hours PRN breakthrough pain  ondansetron Injectable 4 milliGRAM(s) IV Push every 6 hours PRN Nausea and/or Vomiting  oxyCODONE    IR 5 milliGRAM(s) Oral every 4 hours PRN Moderate Pain (4 - 6)  oxyCODONE    IR 10 milliGRAM(s) Oral every 4 hours PRN Severe Pain (7 - 10)      ALLERGIES:  Allergies    No Known Allergies    Intolerances        LABS:                          14.1   9.64  )-----------( 189      ( 30 Jun 2019 07:10 )             40.0         CBC Full  -  ( 30 Jun 2019 07:10 )  WBC Count : 9.64 K/uL  RBC Count : 4.01 M/uL  Hemoglobin : 14.1 g/dL  Hematocrit : 40.0 %  Platelet Count - Automated : 189 K/uL  Mean Cell Volume : 99.8 fl  Mean Cell Hemoglobin : 35.2 pg  Mean Cell Hemoglobin Concentration : 35.3 gm/dL  Auto Neutrophil # : x  Auto Lymphocyte # : x  Auto Monocyte # : x  Auto Eosinophil # : x  Auto Basophil # : x  Auto Neutrophil % : x  Auto Lymphocyte % : x  Auto Monocyte % : x  Auto Eosinophil % : x  Auto Basophil % : x        06-30    139  |  101  |  11  ----------------------------<  99  3.8   |  30  |  1.06    Ca    8.7      30 Jun 2019 07:10
S: No events overnight    O:   Vital Signs Last 24 Hrs  T(C): 37.8 (29 Jun 2019 05:13), Max: 37.8 (29 Jun 2019 05:13)  T(F): 100 (29 Jun 2019 05:13), Max: 100 (29 Jun 2019 05:13)  HR: 74 (29 Jun 2019 05:13) (50 - 74)  BP: 127/69 (29 Jun 2019 05:13) (95/55 - 160/79)  BP(mean): 102 (28 Jun 2019 22:48) (84 - 103)  RR: 16 (29 Jun 2019 05:13) (7 - 18)  SpO2: 96% (29 Jun 2019 05:13) (95% - 100%)    06-28 @ 07:01  -  06-29 @ 07:00  --------------------------------------------------------  IN:    lactated ringers.: 90 mL    lactated ringers.: 200 mL  Total IN: 290 mL    OUT:    Voided: 1725 mL  Total OUT: 1725 mL    Total NET: -1435 mL        PE:  RLE  Splint CDI  SILT toes  Toes WWP  Fires EHL/FHL                          13.9   9.36  )-----------( 204      ( 29 Jun 2019 09:20 )             40.6     06-29    139  |  102  |  x   ----------------------------<  108<H>  3.8   |  27  |  0.96    Ca    8.5      29 Jun 2019 09:20  Mg     2.0     06-27    TPro  7.8  /  Alb  3.9  /  TBili  0.3  /  DBili  x   /  AST  23  /  ALT  23  /  AlkPhos  82  06-27
S: events overnight    O:   Vital Signs Last 24 Hrs  T(C): 36.9 (30 Jun 2019 09:21), Max: 37.7 (30 Jun 2019 00:14)  T(F): 98.5 (30 Jun 2019 09:21), Max: 99.9 (30 Jun 2019 00:14)  HR: 58 (30 Jun 2019 09:21) (48 - 75)  BP: 142/84 (30 Jun 2019 09:21) (128/75 - 157/80)  BP(mean): --  ABP: --  ABP(mean): --  RR: 18 (30 Jun 2019 09:21) (14 - 18)  SpO2: 95% (30 Jun 2019 09:21) (93% - 100%)    PE:  RLE  Splint CDI  SILT toes  Toes WWP  Fires EHL/L                          14.1   9.64  )-----------( 189      ( 30 Jun 2019 07:10 )             40.0   06-30    139  |  101  |  11  ----------------------------<  99  3.8   |  30  |  1.06    Ca    8.7      30 Jun 2019 07:10
INTERVAL HPI/OVERNIGHT EVENTS: s/p surgery on 19    SUBJECTIVE: Patient seen and examined at bedside. Reports 4/10 pain. No fever, chills, SOB, CP, palpitations, n/v or diarrhea.     OBJECTIVE:    VITAL SIGNS:  ICU Vital Signs Last 24 Hrs  T(C): 37.3 (2019 10:06), Max: 37.8 (2019 05:13)  T(F): 99.1 (2019 10:06), Max: 100 (2019 05:13)  HR: 75 (2019 10:06) (50 - 75)  BP: 142/85 (2019 10:06) (95/55 - 160/79)  BP(mean): 102 (2019 22:48) (84 - 103)  ABP: --  ABP(mean): --  RR: 17 (2019 10:06) (7 - 18)  SpO2: 96% (2019 10:06) (95% - 100%)         @ 07: @ 07:00  --------------------------------------------------------  IN: 290 mL / OUT: 1725 mL / NET: -1435 mL     @ 07: @ 11:17  --------------------------------------------------------  IN: 300 mL / OUT: 0 mL / NET: 300 mL      CAPILLARY BLOOD GLUCOSE      POCT Blood Glucose.: 78 mg/dL (2019 16:22)      PHYSICAL EXAM:    General: NAD  HEENT: NC/AT; PERRL, clear conjunctiva  Neck: supple  Respiratory: CTA b/l  Cardiovascular: +S1/S2; RRR  Abdomen: soft, NT/ND; +BS x4  Extremities: WWP, 2+ peripheral pulses b/l; no LE edema  Skin: normal color and turgor; no rash  Neurological: AAOx3, no focal deficits.     MEDICATIONS:  MEDICATIONS  (STANDING):  aspirin enteric coated 81 milliGRAM(s) Oral two times a day  atorvastatin 20 milliGRAM(s) Oral at bedtime  docusate sodium 100 milliGRAM(s) Oral three times a day  lactated ringers. 1000 milliLiter(s) (100 mL/Hr) IV Continuous <Continuous>  metoprolol succinate ER 25 milliGRAM(s) Oral daily  pantoprazole    Tablet 40 milliGRAM(s) Oral before breakfast    MEDICATIONS  (PRN):  acetaminophen   Tablet .. 650 milliGRAM(s) Oral every 6 hours PRN Temp greater or equal to 38C (100.4F), Mild Pain (1 - 3)  metoclopramide Injectable 10 milliGRAM(s) IV Push every 6 hours PRN Nausea and/or Vomiting  morphine  - Injectable 4 milliGRAM(s) IV Push every 4 hours PRN breakthrough pain  ondansetron Injectable 4 milliGRAM(s) IV Push every 6 hours PRN Nausea and/or Vomiting  oxyCODONE    IR 5 milliGRAM(s) Oral every 4 hours PRN Moderate Pain (4 - 6)  oxyCODONE    IR 10 milliGRAM(s) Oral every 4 hours PRN Severe Pain (7 - 10)      ALLERGIES:  Allergies    No Known Allergies    Intolerances        LABS:                        13.9   9.36  )-----------( 204      ( 2019 09:20 )             40.6         139  |  102  |  10  ----------------------------<  108<H>  3.8   |  27  |  0.96    Ca    8.5      2019 09:20  Mg     2.0         TPro  7.8  /  Alb  3.9  /  TBili  0.3  /  DBili  x   /  AST  23  /  ALT  23  /  AlkPhos  82      PT/INR - ( 2019 06:56 )   PT: 12.8 sec;   INR: 1.13          PTT - ( 2019 06:56 )  PTT:28.0 sec  Urinalysis Basic - ( 2019 10:26 )    Color: Yellow / Appearance: Clear / S.020 / pH: x  Gluc: x / Ketone: NEGATIVE  / Bili: Negative / Urobili: 0.2 E.U./dL   Blood: x / Protein: NEGATIVE mg/dL / Nitrite: NEGATIVE   Leuk Esterase: NEGATIVE / RBC: x / WBC x   Sq Epi: x / Non Sq Epi: x / Bacteria: x        RADIOLOGY & ADDITIONAL TESTS: Reviewed.

## 2019-06-30 NOTE — DISCHARGE NOTE NURSING/CASE MANAGEMENT/SOCIAL WORK - NSDCDPATPORTLINK_GEN_ALL_CORE
You can access the [email or web address] Patient Portal, offered by [facility], by registering with the following website: [email or web address]

## 2019-06-30 NOTE — PROGRESS NOTE ADULT - ASSESSMENT
64M with h/o HTN, HLD BIBA for right ankle burning pain s/p mechanical fall found to have acute R trimalleolar Mallory B ankle fracture, now s/p OR on 6/28. POD # 3    # Post op state  PT therapy- dispo home  incentive spirometry  mechanical SCD ppx  OOB to chair   pain control and bowel regimen    #HTN  -well controlled  -c/w metoprolol, may change to alternative anti hypertensive if HR below 60    #HLD  -c/w Lipitor 10qd (home med)    #VTE ppx  aspirin 81 mg bid 64M with h/o HTN, HLD BIBA for right ankle burning pain s/p mechanical fall found to have acute R trimalleolar Mallory B ankle fracture, now s/p OR on 6/28. POD # 3    # Post op state  PT therapy- dispo home  incentive spirometry  mechanical SCD ppx  OOB to chair   pain control and bowel regimen    #HTN  -well controlled however pt is bradycardic on metoprolol  switch to lisinopril 5 mg daily      #HLD  -c/w Lipitor 10qd (home med)    #VTE ppx  aspirin 81 mg bid

## 2019-06-30 NOTE — PROGRESS NOTE ADULT - ASSESSMENT
64y m s/p R ankle ORIF on 6/28 POD2    -PT NWB RLE  -Telemetry for NESSA  -Pain control  -DVT PPX: ASA  -Dispo Planning: Home pending PT clearance

## 2019-06-30 NOTE — DISCHARGE NOTE PROVIDER - CARE PROVIDER_API CALL
Moi Cristobal)  Orthopaedic Surgery  [address] [place] [address]  Phone: [phone]  Fax: [phone]  Follow Up Time:

## 2019-06-30 NOTE — PROGRESS NOTE ADULT - REASON FOR ADMISSION
R ankle fracture

## 2019-06-30 NOTE — DISCHARGE NOTE PROVIDER - NSDCCPGOAL_GEN_ALL_CORE_FT
No strenuous activity, heavy lifting, driving or returning to work until cleared by MD.  NWB RLE with Crutches or Walker  Elevation  You may shower - keep splint completely dry  Try to have regular bowel movements, take stool softener or laxative if necessary.  May take pepcid or zantac for upset stomach.  May take Aleve or naproxen  Swelling may travel all the way down leg to foot, this is normal and will subside in a few weeks.  Follow up with Dr. Cristobal to schedule an appt, if you have staples or sutures they will be removed in office.  Contact your doctor if you experience: fever greater than 101.5, chills, chest pain, difficulty breathing, redness or excessive drainage around the incision, other concerns.

## 2019-06-30 NOTE — DISCHARGE NOTE PROVIDER - HOSPITAL COURSE
Patient was admitted to the hospital.  Underwent uneventful surgery.  Was given appropriate main-operative antibiotics.  Monitored post operatively for excessive bleeding as well as pain control.  Patient received adequate DVT prophylaxis and was evaluated by Physical Therapy prior to discharge.

## 2019-07-03 DIAGNOSIS — N17.9 ACUTE KIDNEY FAILURE, UNSPECIFIED: ICD-10-CM
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
## 2019-07-03 DIAGNOSIS — G47.33 OBSTRUCTIVE SLEEP APNEA (ADULT) (PEDIATRIC): ICD-10-CM

## 2019-07-03 DIAGNOSIS — S82.841A DISPLACED BIMALLEOLAR FRACTURE OF RIGHT LOWER LEG, INITIAL ENCOUNTER FOR CLOSED FRACTURE: ICD-10-CM

## 2019-07-03 DIAGNOSIS — E78.5 HYPERLIPIDEMIA, UNSPECIFIED: ICD-10-CM

## 2019-07-03 DIAGNOSIS — I10 ESSENTIAL (PRIMARY) HYPERTENSION: ICD-10-CM
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
## 2019-07-03 PROCEDURE — 73610 X-RAY EXAM OF ANKLE: CPT

## 2019-07-03 PROCEDURE — 93005 ELECTROCARDIOGRAM TRACING: CPT

## 2019-07-03 PROCEDURE — 85730 THROMBOPLASTIN TIME PARTIAL: CPT

## 2019-07-03 PROCEDURE — 83735 ASSAY OF MAGNESIUM: CPT

## 2019-07-03 PROCEDURE — 99285 EMERGENCY DEPT VISIT HI MDM: CPT | Mod: 25

## 2019-07-03 PROCEDURE — C1713: CPT

## 2019-07-03 PROCEDURE — 96375 TX/PRO/DX INJ NEW DRUG ADDON: CPT

## 2019-07-03 PROCEDURE — 85610 PROTHROMBIN TIME: CPT

## 2019-07-03 PROCEDURE — 76000 FLUOROSCOPY <1 HR PHYS/QHP: CPT

## 2019-07-03 PROCEDURE — 36415 COLL VENOUS BLD VENIPUNCTURE: CPT

## 2019-07-03 PROCEDURE — 94660 CPAP INITIATION&MGMT: CPT

## 2019-07-03 PROCEDURE — 80048 BASIC METABOLIC PNL TOTAL CA: CPT

## 2019-07-03 PROCEDURE — 97162 PT EVAL MOD COMPLEX 30 MIN: CPT

## 2019-07-03 PROCEDURE — 80053 COMPREHEN METABOLIC PANEL: CPT

## 2019-07-03 PROCEDURE — 97116 GAIT TRAINING THERAPY: CPT

## 2019-07-03 PROCEDURE — 81003 URINALYSIS AUTO W/O SCOPE: CPT

## 2019-07-03 PROCEDURE — 85025 COMPLETE CBC W/AUTO DIFF WBC: CPT

## 2019-07-03 PROCEDURE — 73700 CT LOWER EXTREMITY W/O DYE: CPT

## 2019-07-03 PROCEDURE — 86900 BLOOD TYPING SEROLOGIC ABO: CPT

## 2019-07-03 PROCEDURE — 82962 GLUCOSE BLOOD TEST: CPT

## 2019-07-03 PROCEDURE — 86901 BLOOD TYPING SEROLOGIC RH(D): CPT

## 2019-07-03 PROCEDURE — 85027 COMPLETE CBC AUTOMATED: CPT

## 2019-07-03 PROCEDURE — 86803 HEPATITIS C AB TEST: CPT

## 2019-07-03 PROCEDURE — 96376 TX/PRO/DX INJ SAME DRUG ADON: CPT

## 2019-07-03 PROCEDURE — 86850 RBC ANTIBODY SCREEN: CPT

## 2019-07-03 PROCEDURE — 96374 THER/PROPH/DIAG INJ IV PUSH: CPT

## 2019-07-22 ENCOUNTER — OUTPATIENT (OUTPATIENT)
Dept: OUTPATIENT SERVICES | Facility: HOSPITAL | Age: 64
LOS: 1 days | End: 2019-07-22

## 2019-07-22 ENCOUNTER — APPOINTMENT (OUTPATIENT)
Dept: RADIOLOGY | Facility: CLINIC | Age: 64
End: 2019-07-22
Payer: MEDICARE

## 2019-07-22 PROBLEM — Z78.9 OTHER SPECIFIED HEALTH STATUS: Chronic | Status: ACTIVE | Noted: 2019-06-27

## 2019-07-22 PROBLEM — Z00.00 ENCOUNTER FOR PREVENTIVE HEALTH EXAMINATION: Status: ACTIVE | Noted: 2019-07-22

## 2019-07-22 PROCEDURE — 73610 X-RAY EXAM OF ANKLE: CPT | Mod: 26,RT

## 2019-08-12 ENCOUNTER — APPOINTMENT (OUTPATIENT)
Dept: RADIOLOGY | Facility: CLINIC | Age: 64
End: 2019-08-12

## 2019-08-12 ENCOUNTER — OUTPATIENT (OUTPATIENT)
Dept: OUTPATIENT SERVICES | Facility: HOSPITAL | Age: 64
LOS: 1 days | End: 2019-08-12
Payer: MEDICARE

## 2019-08-12 PROCEDURE — 73610 X-RAY EXAM OF ANKLE: CPT

## 2019-08-12 PROCEDURE — 73610 X-RAY EXAM OF ANKLE: CPT | Mod: 26,RT

## 2019-09-23 ENCOUNTER — APPOINTMENT (OUTPATIENT)
Dept: RADIOLOGY | Facility: CLINIC | Age: 64
End: 2019-09-23
Payer: MEDICARE

## 2019-09-23 ENCOUNTER — OUTPATIENT (OUTPATIENT)
Dept: OUTPATIENT SERVICES | Facility: HOSPITAL | Age: 64
LOS: 1 days | End: 2019-09-23

## 2019-09-23 PROCEDURE — 73610 X-RAY EXAM OF ANKLE: CPT | Mod: 26,RT

## 2019-12-23 ENCOUNTER — APPOINTMENT (OUTPATIENT)
Dept: RADIOLOGY | Facility: CLINIC | Age: 64
End: 2019-12-23
Payer: MEDICARE

## 2019-12-23 ENCOUNTER — OUTPATIENT (OUTPATIENT)
Dept: OUTPATIENT SERVICES | Facility: HOSPITAL | Age: 64
LOS: 1 days | End: 2019-12-23

## 2019-12-23 PROCEDURE — 73610 X-RAY EXAM OF ANKLE: CPT | Mod: 26,RT

## 2021-01-18 NOTE — PATIENT PROFILE ADULT - ANY SIGNIFICANT CHANGE IN ABILITY TO PERFORM THE FOLLOWING ADL SINCE THE ONSET OF PRESENT ILLNESS?
1/19/2021 Patient: Echo Beth YOB: 1974 Date of Visit: 1/18/2021 Kiki Espinal MD 
[address] [place] [id number] [address] [employer] Dear Kiki Espinal MD, Thank you for referring Ms. Raoul Velázquez to [address] [place] [address] for evaluation. My notes for this consultation are attached. If you have questions, please do not hesitate to call me. I look forward to following your patient along with you.  
 
 
Sincerely, 
 
Stan Beckford MD 
 
 dressing/personal hygiene
